# Patient Record
Sex: FEMALE | Race: OTHER
[De-identification: names, ages, dates, MRNs, and addresses within clinical notes are randomized per-mention and may not be internally consistent; named-entity substitution may affect disease eponyms.]

---

## 2020-06-12 ENCOUNTER — HOSPITAL ENCOUNTER (EMERGENCY)
Dept: HOSPITAL 94 - ER | Age: 65
Discharge: HOME | End: 2020-06-12
Payer: MEDICAID

## 2020-06-12 VITALS — HEIGHT: 59 IN | BODY MASS INDEX: 28.28 KG/M2 | WEIGHT: 140.3 LBS

## 2020-06-12 VITALS — SYSTOLIC BLOOD PRESSURE: 125 MMHG | DIASTOLIC BLOOD PRESSURE: 96 MMHG

## 2020-06-12 DIAGNOSIS — R94.8: ICD-10-CM

## 2020-06-12 DIAGNOSIS — R07.89: Primary | ICD-10-CM

## 2020-06-12 DIAGNOSIS — E11.9: ICD-10-CM

## 2020-06-12 DIAGNOSIS — R53.83: ICD-10-CM

## 2020-06-12 DIAGNOSIS — Z79.899: ICD-10-CM

## 2020-06-12 DIAGNOSIS — R06.2: ICD-10-CM

## 2020-06-12 DIAGNOSIS — E78.00: ICD-10-CM

## 2020-06-12 DIAGNOSIS — R53.1: ICD-10-CM

## 2020-06-12 DIAGNOSIS — I10: ICD-10-CM

## 2020-06-12 LAB
ALBUMIN SERPL BCP-MCNC: 4.4 G/DL (ref 3.4–5)
ALBUMIN/GLOB SERPL: 1 {RATIO} (ref 1.1–1.5)
ALP SERPL-CCNC: 71 IU/L (ref 46–116)
ALT SERPL W P-5'-P-CCNC: 36 U/L (ref 12–78)
ANION GAP SERPL CALCULATED.3IONS-SCNC: 10 MMOL/L (ref 8–16)
AST SERPL W P-5'-P-CCNC: 31 U/L (ref 10–37)
BASOPHILS # BLD AUTO: 0 X10'3 (ref 0–0.2)
BASOPHILS NFR BLD AUTO: 0.4 % (ref 0–1)
BILIRUB SERPL-MCNC: 0.3 MG/DL (ref 0.1–1)
BUN SERPL-MCNC: 12 MG/DL (ref 7–18)
BUN/CREAT SERPL: 12.9 (ref 6.6–38)
CALCIUM SERPL-MCNC: 9.2 MG/DL (ref 8.5–10.1)
CHLORIDE SERPL-SCNC: 94 MMOL/L (ref 99–107)
CO2 SERPL-SCNC: 27.8 MMOL/L (ref 24–32)
CREAT SERPL-MCNC: 0.93 MG/DL (ref 0.4–0.9)
EOSINOPHIL # BLD AUTO: 0 X10'3 (ref 0–0.9)
EOSINOPHIL NFR BLD AUTO: 0.6 % (ref 0–6)
ERYTHROCYTE [DISTWIDTH] IN BLOOD BY AUTOMATED COUNT: 13.9 % (ref 11.5–14.5)
GFR SERPL CREATININE-BSD FRML MDRD: 61 ML/MIN
GLUCOSE SERPL-MCNC: 153 MG/DL (ref 70–104)
HCT VFR BLD AUTO: 38.1 % (ref 35–45)
HGB BLD-MCNC: 12.8 G/DL (ref 12–16)
LYMPHOCYTES # BLD AUTO: 1.3 X10'3 (ref 1.1–4.8)
LYMPHOCYTES NFR BLD AUTO: 24.5 % (ref 21–51)
MCH RBC QN AUTO: 29.1 PG (ref 27–31)
MCHC RBC AUTO-ENTMCNC: 33.5 G/DL (ref 33–36.5)
MCV RBC AUTO: 86.9 FL (ref 78–98)
MONOCYTES # BLD AUTO: 0.5 X10'3 (ref 0–0.9)
MONOCYTES NFR BLD AUTO: 10.1 % (ref 2–12)
NEUTROPHILS # BLD AUTO: 3.3 X10'3 (ref 1.8–7.7)
NEUTROPHILS NFR BLD AUTO: 64.4 % (ref 42–75)
PLATELET # BLD AUTO: 233 X10'3 (ref 140–440)
PMV BLD AUTO: 7.4 FL (ref 7.4–10.4)
POTASSIUM SERPL-SCNC: 4 MMOL/L (ref 3.5–5.1)
PROT SERPL-MCNC: 8.7 G/DL (ref 6.4–8.2)
RBC # BLD AUTO: 4.38 X10'6 (ref 4.2–5.6)
SODIUM SERPL-SCNC: 132 MMOL/L (ref 135–145)
WBC # BLD AUTO: 5.2 X10'3 (ref 4.5–11)

## 2020-06-12 PROCEDURE — 36415 COLL VENOUS BLD VENIPUNCTURE: CPT

## 2020-06-12 PROCEDURE — 94760 N-INVAS EAR/PLS OXIMETRY 1: CPT

## 2020-06-12 PROCEDURE — 80053 COMPREHEN METABOLIC PANEL: CPT

## 2020-06-12 PROCEDURE — 93005 ELECTROCARDIOGRAM TRACING: CPT

## 2020-06-12 PROCEDURE — 71275 CT ANGIOGRAPHY CHEST: CPT

## 2020-06-12 PROCEDURE — 94640 AIRWAY INHALATION TREATMENT: CPT

## 2020-06-12 PROCEDURE — 84484 ASSAY OF TROPONIN QUANT: CPT

## 2020-06-12 PROCEDURE — 85025 COMPLETE CBC W/AUTO DIFF WBC: CPT

## 2020-06-12 PROCEDURE — 99285 EMERGENCY DEPT VISIT HI MDM: CPT

## 2020-06-12 PROCEDURE — 71045 X-RAY EXAM CHEST 1 VIEW: CPT

## 2021-03-16 ENCOUNTER — HOSPITAL ENCOUNTER (EMERGENCY)
Dept: HOSPITAL 94 - ER | Age: 66
Discharge: HOME | End: 2021-03-16
Payer: MEDICAID

## 2021-03-16 VITALS — HEIGHT: 59 IN | BODY MASS INDEX: 27.29 KG/M2 | WEIGHT: 135.36 LBS

## 2021-03-16 VITALS — DIASTOLIC BLOOD PRESSURE: 70 MMHG | SYSTOLIC BLOOD PRESSURE: 146 MMHG

## 2021-03-16 DIAGNOSIS — E11.9: ICD-10-CM

## 2021-03-16 DIAGNOSIS — R94.31: ICD-10-CM

## 2021-03-16 DIAGNOSIS — E78.00: ICD-10-CM

## 2021-03-16 DIAGNOSIS — R74.02: ICD-10-CM

## 2021-03-16 DIAGNOSIS — R51.9: ICD-10-CM

## 2021-03-16 DIAGNOSIS — Z79.899: ICD-10-CM

## 2021-03-16 DIAGNOSIS — J84.10: ICD-10-CM

## 2021-03-16 DIAGNOSIS — R42: Primary | ICD-10-CM

## 2021-03-16 DIAGNOSIS — I10: ICD-10-CM
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
## 2021-03-16 DIAGNOSIS — T78.8XXA: ICD-10-CM

## 2021-03-16 DIAGNOSIS — R11.10: ICD-10-CM

## 2021-03-16 LAB
ALBUMIN SERPL BCP-MCNC: 3.8 G/DL (ref 3.4–5)
ALBUMIN/GLOB SERPL: 0.8 {RATIO} (ref 1.1–1.5)
ALP SERPL-CCNC: 191 IU/L (ref 46–116)
ALT SERPL W P-5'-P-CCNC: 642 U/L (ref 12–78)
ANION GAP SERPL CALCULATED.3IONS-SCNC: 13 MMOL/L (ref 8–16)
AST SERPL W P-5'-P-CCNC: 293 U/L (ref 10–37)
BASOPHILS # BLD AUTO: 0 X10'3 (ref 0–0.2)
BASOPHILS NFR BLD AUTO: 0.3 % (ref 0–1)
BILIRUB SERPL-MCNC: 0.5 MG/DL (ref 0.1–1)
BUN SERPL-MCNC: 5 MG/DL (ref 7–18)
BUN/CREAT SERPL: 6.3 (ref 6.6–38)
CALCIUM SERPL-MCNC: 8.7 MG/DL (ref 8.5–10.1)
CHLORIDE SERPL-SCNC: 101 MMOL/L (ref 99–107)
CO2 SERPL-SCNC: 26 MMOL/L (ref 24–32)
CREAT SERPL-MCNC: 0.79 MG/DL (ref 0.4–0.9)
EOSINOPHIL # BLD AUTO: 0 X10'3 (ref 0–0.9)
EOSINOPHIL NFR BLD AUTO: 0.4 % (ref 0–6)
ERYTHROCYTE [DISTWIDTH] IN BLOOD BY AUTOMATED COUNT: 14.1 % (ref 11.5–14.5)
GFR SERPL CREATININE-BSD FRML MDRD: 73 ML/MIN
GLUCOSE SERPL-MCNC: 223 MG/DL (ref 70–104)
HCT VFR BLD AUTO: 38.5 % (ref 35–45)
HGB BLD-MCNC: 12.5 G/DL (ref 12–16)
LYMPHOCYTES # BLD AUTO: 0.8 X10'3 (ref 1.1–4.8)
LYMPHOCYTES NFR BLD AUTO: 13.8 % (ref 21–51)
MAGNESIUM SERPL-MCNC: 1.6 MG/DL (ref 1.5–2.4)
MCH RBC QN AUTO: 28.7 PG (ref 27–31)
MCHC RBC AUTO-ENTMCNC: 32.6 G/DL (ref 33–36.5)
MCV RBC AUTO: 87.9 FL (ref 78–98)
MONOCYTES # BLD AUTO: 0.4 X10'3 (ref 0–0.9)
MONOCYTES NFR BLD AUTO: 7.4 % (ref 2–12)
NEUTROPHILS # BLD AUTO: 4.6 X10'3 (ref 1.8–7.7)
NEUTROPHILS NFR BLD AUTO: 78.1 % (ref 42–75)
PLATELET # BLD AUTO: 206 X10'3 (ref 140–440)
PMV BLD AUTO: 7.3 FL (ref 7.4–10.4)
POTASSIUM SERPL-SCNC: 3.3 MMOL/L (ref 3.5–5.1)
PROT SERPL-MCNC: 8.5 G/DL (ref 6.4–8.2)
RBC # BLD AUTO: 4.38 X10'6 (ref 4.2–5.6)
SODIUM SERPL-SCNC: 140 MMOL/L (ref 135–145)
WBC # BLD AUTO: 5.9 X10'3 (ref 4.5–11)

## 2021-03-16 PROCEDURE — 96365 THER/PROPH/DIAG IV INF INIT: CPT

## 2021-03-16 PROCEDURE — 80053 COMPREHEN METABOLIC PANEL: CPT

## 2021-03-16 PROCEDURE — 85025 COMPLETE CBC W/AUTO DIFF WBC: CPT

## 2021-03-16 PROCEDURE — 83735 ASSAY OF MAGNESIUM: CPT

## 2021-03-16 PROCEDURE — 93005 ELECTROCARDIOGRAM TRACING: CPT

## 2021-03-16 PROCEDURE — 99285 EMERGENCY DEPT VISIT HI MDM: CPT

## 2021-03-16 PROCEDURE — 96375 TX/PRO/DX INJ NEW DRUG ADDON: CPT

## 2021-03-16 PROCEDURE — 70450 CT HEAD/BRAIN W/O DYE: CPT

## 2021-03-16 PROCEDURE — 36415 COLL VENOUS BLD VENIPUNCTURE: CPT

## 2021-10-03 ENCOUNTER — HOSPITAL ENCOUNTER (EMERGENCY)
Dept: HOSPITAL 94 - ER | Age: 66
Discharge: HOME | End: 2021-10-03
Payer: MEDICAID

## 2021-10-03 VITALS — BODY MASS INDEX: 23.56 KG/M2 | WEIGHT: 116.84 LBS | HEIGHT: 59 IN

## 2021-10-03 VITALS — SYSTOLIC BLOOD PRESSURE: 131 MMHG | DIASTOLIC BLOOD PRESSURE: 68 MMHG

## 2021-10-03 DIAGNOSIS — I27.20: ICD-10-CM

## 2021-10-03 DIAGNOSIS — Z79.899: ICD-10-CM

## 2021-10-03 DIAGNOSIS — E78.00: ICD-10-CM

## 2021-10-03 DIAGNOSIS — R42: Primary | ICD-10-CM

## 2021-10-03 DIAGNOSIS — E11.9: ICD-10-CM

## 2021-10-03 LAB
ALBUMIN SERPL BCP-MCNC: 3.9 G/DL (ref 3.4–5)
ALBUMIN/GLOB SERPL: 0.8 {RATIO} (ref 1.1–1.5)
ALP SERPL-CCNC: 90 IU/L (ref 46–116)
ALT SERPL W P-5'-P-CCNC: 24 U/L (ref 12–78)
AMPHETAMINES UR QL SCN: NEGATIVE
ANION GAP SERPL CALCULATED.3IONS-SCNC: 13 MMOL/L (ref 8–16)
AST SERPL W P-5'-P-CCNC: 20 U/L (ref 10–37)
BACTERIA URNS QL MICRO: (no result) /HPF
BARBITURATES UR QL SCN: NEGATIVE
BASOPHILS # BLD AUTO: 0 X10'3 (ref 0–0.2)
BASOPHILS NFR BLD AUTO: 0.2 % (ref 0–1)
BENZODIAZ UR QL SCN: NEGATIVE
BILIRUB SERPL-MCNC: 0.3 MG/DL (ref 0.1–1)
BUN SERPL-MCNC: 4 MG/DL (ref 7–18)
BUN/CREAT SERPL: 5.3 (ref 6.6–38)
BZE UR QL SCN: NEGATIVE
CALCIUM SERPL-MCNC: 9.1 MG/DL (ref 8.5–10.1)
CANNABINOIDS UR QL SCN: NEGATIVE
CHLORIDE SERPL-SCNC: 87 MMOL/L (ref 99–107)
CLARITY UR: (no result)
CO2 SERPL-SCNC: 24.8 MMOL/L (ref 24–32)
COLOR UR: (no result)
CREAT SERPL-MCNC: 0.75 MG/DL (ref 0.4–0.9)
DEPRECATED SQUAMOUS URNS QL MICRO: (no result) /LPF
EOSINOPHIL # BLD AUTO: 0 X10'3 (ref 0–0.9)
EOSINOPHIL NFR BLD AUTO: 0.1 % (ref 0–6)
ERYTHROCYTE [DISTWIDTH] IN BLOOD BY AUTOMATED COUNT: 13.9 % (ref 11.5–14.5)
ETHANOL SERPL-MCNC: < 0.01 GM/DL (ref 0–0.01)
GFR SERPL CREATININE-BSD FRML MDRD: 78 ML/MIN
GLUCOSE SERPL-MCNC: 200 MG/DL (ref 70–104)
GLUCOSE UR STRIP-MCNC: 250 MG/DL
HCG UR QL: NEGATIVE
HCT VFR BLD AUTO: 35.9 % (ref 35–45)
HGB BLD-MCNC: 12.1 G/DL (ref 12–16)
HGB UR QL STRIP: (no result)
KETONES UR STRIP-MCNC: 15 MG/DL
LEUKOCYTE ESTERASE UR QL STRIP: NEGATIVE
LIPASE SERPL-CCNC: 98 U/L (ref 73–393)
LYMPHOCYTES # BLD AUTO: 0.9 X10'3 (ref 1.1–4.8)
LYMPHOCYTES NFR BLD AUTO: 11.8 % (ref 21–51)
MCH RBC QN AUTO: 28.4 PG (ref 27–31)
MCHC RBC AUTO-ENTMCNC: 33.7 G/DL (ref 33–36.5)
MCV RBC AUTO: 84.1 FL (ref 78–98)
METHADONE UR QL SCN: NEGATIVE
MONOCYTES # BLD AUTO: 0.7 X10'3 (ref 0–0.9)
MONOCYTES NFR BLD AUTO: 8.7 % (ref 2–12)
NEUTROPHILS # BLD AUTO: 6.1 X10'3 (ref 1.8–7.7)
NEUTROPHILS NFR BLD AUTO: 79.2 % (ref 42–75)
NITRITE UR QL STRIP: NEGATIVE
OPIATES UR QL SCN: NEGATIVE
PCP UR QL SCN: NEGATIVE
PH UR STRIP: 7.5 [PH] (ref 4.8–8)
PLATELET # BLD AUTO: 273 X10'3 (ref 140–440)
PMV BLD AUTO: 6.7 FL (ref 7.4–10.4)
POTASSIUM SERPL-SCNC: 3.7 MMOL/L (ref 3.5–5.1)
PROT SERPL-MCNC: 8.9 G/DL (ref 6.4–8.2)
PROT UR QL STRIP: NEGATIVE MG/DL
RBC # BLD AUTO: 4.27 X10'6 (ref 4.2–5.6)
RBC #/AREA URNS HPF: (no result) /HPF (ref 0–2)
SODIUM SERPL-SCNC: 125 MMOL/L (ref 135–145)
SP GR UR STRIP: 1.01 (ref 1–1.03)
URN COLLECT METHOD CLASS: (no result)
UROBILINOGEN UR STRIP-MCNC: 0.2 E.U/DL (ref 0.2–1)
WBC # BLD AUTO: 7.7 X10'3 (ref 4.5–11)
WBC #/AREA URNS HPF: (no result) /HPF (ref 0–4)

## 2021-10-03 PROCEDURE — 80305 DRUG TEST PRSMV DIR OPT OBS: CPT

## 2021-10-03 PROCEDURE — 96360 HYDRATION IV INFUSION INIT: CPT

## 2021-10-03 PROCEDURE — 83880 ASSAY OF NATRIURETIC PEPTIDE: CPT

## 2021-10-03 PROCEDURE — 83690 ASSAY OF LIPASE: CPT

## 2021-10-03 PROCEDURE — 93005 ELECTROCARDIOGRAM TRACING: CPT

## 2021-10-03 PROCEDURE — 85025 COMPLETE CBC W/AUTO DIFF WBC: CPT

## 2021-10-03 PROCEDURE — 80053 COMPREHEN METABOLIC PANEL: CPT

## 2021-10-03 PROCEDURE — 84484 ASSAY OF TROPONIN QUANT: CPT

## 2021-10-03 PROCEDURE — 71045 X-RAY EXAM CHEST 1 VIEW: CPT

## 2021-10-03 PROCEDURE — 80320 DRUG SCREEN QUANTALCOHOLS: CPT

## 2021-10-03 PROCEDURE — 99285 EMERGENCY DEPT VISIT HI MDM: CPT

## 2021-10-03 PROCEDURE — 81025 URINE PREGNANCY TEST: CPT

## 2021-10-03 PROCEDURE — 81001 URINALYSIS AUTO W/SCOPE: CPT

## 2021-10-03 PROCEDURE — 36415 COLL VENOUS BLD VENIPUNCTURE: CPT

## 2022-01-17 ENCOUNTER — HOSPITAL ENCOUNTER (OUTPATIENT)
Dept: HOSPITAL 94 - CARD DIAG | Age: 67
Discharge: HOME | End: 2022-01-17
Attending: INTERNAL MEDICINE
Payer: MEDICAID

## 2022-01-17 DIAGNOSIS — E11.9: ICD-10-CM

## 2022-01-17 DIAGNOSIS — J84.10: ICD-10-CM

## 2022-01-17 DIAGNOSIS — I08.1: Primary | ICD-10-CM

## 2022-01-17 PROCEDURE — 93306 TTE W/DOPPLER COMPLETE: CPT

## 2022-04-10 ENCOUNTER — HOSPITAL ENCOUNTER (INPATIENT)
Dept: HOSPITAL 94 - ER | Age: 67
LOS: 4 days | Discharge: HOME | DRG: 140 | End: 2022-04-14
Attending: FAMILY MEDICINE | Admitting: FAMILY MEDICINE
Payer: MEDICAID

## 2022-04-10 VITALS — WEIGHT: 100.2 LBS | BODY MASS INDEX: 20.2 KG/M2 | HEIGHT: 59 IN

## 2022-04-10 VITALS — SYSTOLIC BLOOD PRESSURE: 162 MMHG | DIASTOLIC BLOOD PRESSURE: 89 MMHG

## 2022-04-10 DIAGNOSIS — E87.1: ICD-10-CM

## 2022-04-10 DIAGNOSIS — I27.20: ICD-10-CM

## 2022-04-10 DIAGNOSIS — E78.00: ICD-10-CM

## 2022-04-10 DIAGNOSIS — E78.5: ICD-10-CM

## 2022-04-10 DIAGNOSIS — I11.0: ICD-10-CM

## 2022-04-10 DIAGNOSIS — G43.909: ICD-10-CM

## 2022-04-10 DIAGNOSIS — Z79.899: ICD-10-CM

## 2022-04-10 DIAGNOSIS — J44.1: Primary | ICD-10-CM

## 2022-04-10 DIAGNOSIS — Z79.84: ICD-10-CM

## 2022-04-10 DIAGNOSIS — G62.9: ICD-10-CM

## 2022-04-10 DIAGNOSIS — J69.0: ICD-10-CM

## 2022-04-10 DIAGNOSIS — I50.33: ICD-10-CM

## 2022-04-10 DIAGNOSIS — E11.9: ICD-10-CM

## 2022-04-10 DIAGNOSIS — J84.10: ICD-10-CM

## 2022-04-10 DIAGNOSIS — Z20.822: ICD-10-CM

## 2022-04-10 DIAGNOSIS — J96.10: ICD-10-CM

## 2022-04-10 LAB
ALBUMIN SERPL BCP-MCNC: 3.3 G/DL (ref 3.4–5)
ALBUMIN/GLOB SERPL: 0.7 {RATIO} (ref 1.1–1.5)
ALP SERPL-CCNC: 56 IU/L (ref 46–116)
ALT SERPL W P-5'-P-CCNC: 23 U/L (ref 12–78)
ANION GAP SERPL CALCULATED.3IONS-SCNC: 14 MMOL/L (ref 8–16)
APTT PPP: 28 SECONDS (ref 22–32)
AST SERPL W P-5'-P-CCNC: 23 U/L (ref 10–37)
BASOPHILS # BLD AUTO: 0 X10'3 (ref 0–0.2)
BASOPHILS NFR BLD AUTO: 0.4 % (ref 0–1)
BILIRUB SERPL-MCNC: 0.2 MG/DL (ref 0.1–1)
BUN SERPL-MCNC: 6 MG/DL (ref 7–18)
BUN/CREAT SERPL: 7.4 (ref 6.6–38)
CALCIUM SERPL-MCNC: 9.4 MG/DL (ref 8.5–10.1)
CHLORIDE SERPL-SCNC: 91 MMOL/L (ref 99–107)
CK SERPL-CCNC: 80 U/L (ref 26–192)
CO2 SERPL-SCNC: 24.7 MMOL/L (ref 24–32)
CREAT SERPL-MCNC: 0.81 MG/DL (ref 0.4–0.9)
EOSINOPHIL # BLD AUTO: 0.1 X10'3 (ref 0–0.9)
EOSINOPHIL NFR BLD AUTO: 1.8 % (ref 0–6)
ERYTHROCYTE [DISTWIDTH] IN BLOOD BY AUTOMATED COUNT: 14.8 % (ref 11.5–14.5)
GFR SERPL CREATININE-BSD FRML MDRD: 71 ML/MIN
GLUCOSE SERPL-MCNC: 187 MG/DL (ref 70–104)
HBA1C MFR BLD: 6.8 % (ref 4.5–6.2)
HCT VFR BLD AUTO: 35.3 % (ref 35–45)
HGB BLD-MCNC: 11.6 G/DL (ref 12–16)
LIPASE SERPL-CCNC: 145 U/L (ref 73–393)
LYMPHOCYTES # BLD AUTO: 1.3 X10'3 (ref 1.1–4.8)
LYMPHOCYTES NFR BLD AUTO: 17.2 % (ref 21–51)
MAGNESIUM SERPL-MCNC: 1.9 MG/DL (ref 1.5–2.4)
MCH RBC QN AUTO: 27 PG (ref 27–31)
MCHC RBC AUTO-ENTMCNC: 33 G/DL (ref 33–36.5)
MCV RBC AUTO: 82 FL (ref 78–98)
MONOCYTES # BLD AUTO: 0.8 X10'3 (ref 0–0.9)
MONOCYTES NFR BLD AUTO: 10.3 % (ref 2–12)
NEUTROPHILS # BLD AUTO: 5.4 X10'3 (ref 1.8–7.7)
NEUTROPHILS NFR BLD AUTO: 70.3 % (ref 42–75)
PHOSPHATE SERPL-MCNC: 3.4 MG/DL (ref 2.3–4.5)
PLATELET # BLD AUTO: 338 X10'3 (ref 140–440)
PMV BLD AUTO: 6.9 FL (ref 7.4–10.4)
POTASSIUM SERPL-SCNC: 4 MMOL/L (ref 3.5–5.1)
PROT SERPL-MCNC: 8.2 G/DL (ref 6.4–8.2)
RBC # BLD AUTO: 4.3 X10'6 (ref 4.2–5.6)
SODIUM SERPL-SCNC: 130 MMOL/L (ref 135–145)
WBC # BLD AUTO: 7.7 X10'3 (ref 4.5–11)

## 2022-04-10 PROCEDURE — 97116 GAIT TRAINING THERAPY: CPT

## 2022-04-10 PROCEDURE — 97110 THERAPEUTIC EXERCISES: CPT

## 2022-04-10 PROCEDURE — 87635 SARS-COV-2 COVID-19 AMP PRB: CPT

## 2022-04-10 PROCEDURE — 85025 COMPLETE CBC W/AUTO DIFF WBC: CPT

## 2022-04-10 PROCEDURE — 84443 ASSAY THYROID STIM HORMONE: CPT

## 2022-04-10 PROCEDURE — 71045 X-RAY EXAM CHEST 1 VIEW: CPT

## 2022-04-10 PROCEDURE — 36415 COLL VENOUS BLD VENIPUNCTURE: CPT

## 2022-04-10 PROCEDURE — 96365 THER/PROPH/DIAG IV INF INIT: CPT

## 2022-04-10 PROCEDURE — 94760 N-INVAS EAR/PLS OXIMETRY 1: CPT

## 2022-04-10 PROCEDURE — 80061 LIPID PANEL: CPT

## 2022-04-10 PROCEDURE — 83036 HEMOGLOBIN GLYCOSYLATED A1C: CPT

## 2022-04-10 PROCEDURE — 84484 ASSAY OF TROPONIN QUANT: CPT

## 2022-04-10 PROCEDURE — 83880 ASSAY OF NATRIURETIC PEPTIDE: CPT

## 2022-04-10 PROCEDURE — 82550 ASSAY OF CK (CPK): CPT

## 2022-04-10 PROCEDURE — 71275 CT ANGIOGRAPHY CHEST: CPT

## 2022-04-10 PROCEDURE — 83735 ASSAY OF MAGNESIUM: CPT

## 2022-04-10 PROCEDURE — 99285 EMERGENCY DEPT VISIT HI MDM: CPT

## 2022-04-10 PROCEDURE — 85730 THROMBOPLASTIN TIME PARTIAL: CPT

## 2022-04-10 PROCEDURE — 80053 COMPREHEN METABOLIC PANEL: CPT

## 2022-04-10 PROCEDURE — 83690 ASSAY OF LIPASE: CPT

## 2022-04-10 PROCEDURE — 92616 FEES W/LARYNGEAL SENSE TEST: CPT

## 2022-04-10 PROCEDURE — 93005 ELECTROCARDIOGRAM TRACING: CPT

## 2022-04-10 PROCEDURE — 97530 THERAPEUTIC ACTIVITIES: CPT

## 2022-04-10 PROCEDURE — 84145 PROCALCITONIN (PCT): CPT

## 2022-04-10 PROCEDURE — 84100 ASSAY OF PHOSPHORUS: CPT

## 2022-04-10 PROCEDURE — 97161 PT EVAL LOW COMPLEX 20 MIN: CPT

## 2022-04-10 PROCEDURE — 94667 MNPJ CHEST WALL 1ST: CPT

## 2022-04-10 PROCEDURE — 94640 AIRWAY INHALATION TREATMENT: CPT

## 2022-04-10 PROCEDURE — 85610 PROTHROMBIN TIME: CPT

## 2022-04-10 PROCEDURE — 87081 CULTURE SCREEN ONLY: CPT

## 2022-04-10 PROCEDURE — 92508 TX SP LANG VOICE COMM GROUP: CPT

## 2022-04-10 PROCEDURE — 82948 REAGENT STRIP/BLOOD GLUCOSE: CPT

## 2022-04-10 RX ADMIN — SODIUM CHLORIDE SCH MLS/HR: 9 INJECTION INTRAMUSCULAR; INTRAVENOUS; SUBCUTANEOUS at 00:33

## 2022-04-11 VITALS — SYSTOLIC BLOOD PRESSURE: 140 MMHG | DIASTOLIC BLOOD PRESSURE: 79 MMHG

## 2022-04-11 VITALS — DIASTOLIC BLOOD PRESSURE: 83 MMHG | SYSTOLIC BLOOD PRESSURE: 143 MMHG

## 2022-04-11 VITALS — DIASTOLIC BLOOD PRESSURE: 71 MMHG | SYSTOLIC BLOOD PRESSURE: 149 MMHG

## 2022-04-11 VITALS — SYSTOLIC BLOOD PRESSURE: 131 MMHG | DIASTOLIC BLOOD PRESSURE: 77 MMHG

## 2022-04-11 LAB
ALBUMIN SERPL BCP-MCNC: 3.2 G/DL (ref 3.4–5)
ALBUMIN/GLOB SERPL: 0.7 {RATIO} (ref 1.1–1.5)
ALP SERPL-CCNC: 52 IU/L (ref 46–116)
ALT SERPL W P-5'-P-CCNC: 22 U/L (ref 12–78)
ANION GAP SERPL CALCULATED.3IONS-SCNC: 7 MMOL/L (ref 8–16)
AST SERPL W P-5'-P-CCNC: 22 U/L (ref 10–37)
BASOPHILS # BLD AUTO: 0 X10'3 (ref 0–0.2)
BASOPHILS NFR BLD AUTO: 0.4 % (ref 0–1)
BILIRUB SERPL-MCNC: 0.3 MG/DL (ref 0.1–1)
BUN SERPL-MCNC: 4 MG/DL (ref 7–18)
BUN/CREAT SERPL: 6.2 (ref 6.6–38)
CALCIUM SERPL-MCNC: 9.2 MG/DL (ref 8.5–10.1)
CHLORIDE SERPL-SCNC: 102 MMOL/L (ref 99–107)
CHOLEST SERPL-MCNC: 129 MG/DL (ref 0–200)
CHOLEST/HDLC SERPL: 2.4 {RATIO} (ref 0–4.99)
CO2 SERPL-SCNC: 28.5 MMOL/L (ref 24–32)
CREAT SERPL-MCNC: 0.65 MG/DL (ref 0.4–0.9)
EOSINOPHIL # BLD AUTO: 0.2 X10'3 (ref 0–0.9)
EOSINOPHIL NFR BLD AUTO: 3 % (ref 0–6)
ERYTHROCYTE [DISTWIDTH] IN BLOOD BY AUTOMATED COUNT: 15 % (ref 11.5–14.5)
GFR SERPL CREATININE-BSD FRML MDRD: > 90 ML/MIN
GLUCOSE SERPL-MCNC: 115 MG/DL (ref 70–104)
HCT VFR BLD AUTO: 34.3 % (ref 35–45)
HDLC SERPL-MCNC: 53 MG/DL (ref 35–60)
HGB BLD-MCNC: 11.3 G/DL (ref 12–16)
LDLC SERPL DIRECT ASSAY-MCNC: 67 MG/DL (ref 50–100)
LYMPHOCYTES # BLD AUTO: 1.6 X10'3 (ref 1.1–4.8)
LYMPHOCYTES NFR BLD AUTO: 25.8 % (ref 21–51)
MCH RBC QN AUTO: 26.5 PG (ref 27–31)
MCHC RBC AUTO-ENTMCNC: 32.8 G/DL (ref 33–36.5)
MCV RBC AUTO: 80.9 FL (ref 78–98)
MONOCYTES # BLD AUTO: 0.8 X10'3 (ref 0–0.9)
MONOCYTES NFR BLD AUTO: 12.9 % (ref 2–12)
NEUTROPHILS # BLD AUTO: 3.7 X10'3 (ref 1.8–7.7)
NEUTROPHILS NFR BLD AUTO: 57.9 % (ref 42–75)
PLATELET # BLD AUTO: 328 X10'3 (ref 140–440)
PMV BLD AUTO: 7.3 FL (ref 7.4–10.4)
POTASSIUM SERPL-SCNC: 3.9 MMOL/L (ref 3.5–5.1)
PROT SERPL-MCNC: 7.7 G/DL (ref 6.4–8.2)
RBC # BLD AUTO: 4.24 X10'6 (ref 4.2–5.6)
SODIUM SERPL-SCNC: 137 MMOL/L (ref 135–145)
TRIGL SERPL-MCNC: 41 MG/DL (ref 20–135)
WBC # BLD AUTO: 6.3 X10'3 (ref 4.5–11)

## 2022-04-11 PROCEDURE — B32S1ZZ COMPUTERIZED TOMOGRAPHY (CT SCAN) OF RIGHT PULMONARY ARTERY USING LOW OSMOLAR CONTRAST: ICD-10-PCS

## 2022-04-11 PROCEDURE — B32T1ZZ COMPUTERIZED TOMOGRAPHY (CT SCAN) OF LEFT PULMONARY ARTERY USING LOW OSMOLAR CONTRAST: ICD-10-PCS

## 2022-04-11 PROCEDURE — B3201ZZ COMPUTERIZED TOMOGRAPHY (CT SCAN) OF THORACIC AORTA USING LOW OSMOLAR CONTRAST: ICD-10-PCS

## 2022-04-11 RX ADMIN — Medication SCH UNIT: at 08:00

## 2022-04-11 RX ADMIN — SODIUM CHLORIDE SCH MLS/HR: 9 INJECTION INTRAMUSCULAR; INTRAVENOUS; SUBCUTANEOUS at 12:33

## 2022-04-11 RX ADMIN — INSULIN LISPRO SCH UNITS: 100 INJECTION, SOLUTION INTRAVENOUS; SUBCUTANEOUS at 18:39

## 2022-04-11 RX ADMIN — HEPARIN SODIUM SCH UNIT: 5000 INJECTION, SOLUTION INTRAVENOUS; SUBCUTANEOUS at 20:22

## 2022-04-11 RX ADMIN — TAZOBACTAM SODIUM AND PIPERACILLIN SODIUM SCH MLS/HR: 500; 4 INJECTION, SOLUTION INTRAVENOUS at 08:00

## 2022-04-11 RX ADMIN — DOCUSATE SODIUM SCH MG: 100 CAPSULE, LIQUID FILLED ORAL at 08:00

## 2022-04-11 RX ADMIN — SODIUM CHLORIDE SCH MLS/HR: 9 INJECTION INTRAMUSCULAR; INTRAVENOUS; SUBCUTANEOUS at 18:20

## 2022-04-11 RX ADMIN — TAZOBACTAM SODIUM AND PIPERACILLIN SODIUM SCH MLS/HR: 500; 4 INJECTION, SOLUTION INTRAVENOUS at 20:16

## 2022-04-11 RX ADMIN — VANCOMYCIN HYDROCHLORIDE SCH MLS/HR: 750 INJECTION, POWDER, LYOPHILIZED, FOR SOLUTION INTRAVENOUS at 23:11

## 2022-04-11 RX ADMIN — HEPARIN SODIUM SCH UNIT: 5000 INJECTION, SOLUTION INTRAVENOUS; SUBCUTANEOUS at 12:32

## 2022-04-11 RX ADMIN — METHYLPREDNISOLONE SODIUM SUCCINATE SCH MG: 40 INJECTION, POWDER, FOR SOLUTION INTRAMUSCULAR; INTRAVENOUS at 12:31

## 2022-04-11 RX ADMIN — METHYLPREDNISOLONE SODIUM SUCCINATE SCH MG: 40 INJECTION, POWDER, FOR SOLUTION INTRAMUSCULAR; INTRAVENOUS at 20:20

## 2022-04-11 RX ADMIN — PANTOPRAZOLE SODIUM SCH MLS/HR: 40 INJECTION, POWDER, FOR SOLUTION INTRAVENOUS at 12:31

## 2022-04-11 RX ADMIN — VANCOMYCIN HYDROCHLORIDE SCH MLS/HR: 750 INJECTION, POWDER, LYOPHILIZED, FOR SOLUTION INTRAVENOUS at 01:02

## 2022-04-11 RX ADMIN — DOCUSATE SODIUM SCH MG: 100 CAPSULE, LIQUID FILLED ORAL at 20:22

## 2022-04-11 RX ADMIN — Medication SCH UNIT: at 19:36

## 2022-04-11 RX ADMIN — Medication SCH UNIT: at 13:00

## 2022-04-11 NOTE — NUR
Problems reprioritized. Patient report given, questions answered & plan of care reviewed 
with SILVIANO CHANG.

## 2022-04-11 NOTE — NUR
eceived report from Breanne SHORE in the ER. Pt arrived on the unit via gurney and was able to 
ambulate with assistance to her bed. Pt was on 2L via N/C, [place] locked, personal 
belongings were placed at bedside except for her phone which she kept with her. Pt is 
currently NPO, this was explained to her with her  at bedside. Pt has no signs of 
distress, will continue to monitor.

## 2022-04-11 NOTE — NUR
Problems reprioritized. Patient report given, questions answered & plan of care reviewed 
with Justina SHORE.

## 2022-04-11 NOTE — NUR
Patient in room . I have received report from Justina SHORE   and had the opportunity to 
ask questions and assume patient care.

## 2022-04-11 NOTE — NUR
Patient in room [facility] 347B. I have received report from SILVIANO CHANG and had the opportunity 
to ask questions and assume patient care.

## 2022-04-12 VITALS — SYSTOLIC BLOOD PRESSURE: 130 MMHG | DIASTOLIC BLOOD PRESSURE: 64 MMHG

## 2022-04-12 VITALS — SYSTOLIC BLOOD PRESSURE: 128 MMHG | DIASTOLIC BLOOD PRESSURE: 48 MMHG

## 2022-04-12 VITALS — DIASTOLIC BLOOD PRESSURE: 47 MMHG | SYSTOLIC BLOOD PRESSURE: 139 MMHG

## 2022-04-12 LAB
ALBUMIN SERPL BCP-MCNC: 2.9 G/DL (ref 3.4–5)
ALBUMIN/GLOB SERPL: 0.6 {RATIO} (ref 1.1–1.5)
ALP SERPL-CCNC: 46 IU/L (ref 46–116)
ALT SERPL W P-5'-P-CCNC: 18 U/L (ref 12–78)
ANION GAP SERPL CALCULATED.3IONS-SCNC: 10 MMOL/L (ref 8–16)
AST SERPL W P-5'-P-CCNC: 21 U/L (ref 10–37)
BASOPHILS # BLD AUTO: 0 X10'3 (ref 0–0.2)
BASOPHILS NFR BLD AUTO: 0.1 % (ref 0–1)
BILIRUB SERPL-MCNC: 0.3 MG/DL (ref 0.1–1)
BUN SERPL-MCNC: 6 MG/DL (ref 7–18)
BUN/CREAT SERPL: 9.4 (ref 6.6–38)
CALCIUM SERPL-MCNC: 9 MG/DL (ref 8.5–10.1)
CHLORIDE SERPL-SCNC: 98 MMOL/L (ref 99–107)
CO2 SERPL-SCNC: 25.8 MMOL/L (ref 24–32)
CREAT SERPL-MCNC: 0.64 MG/DL (ref 0.4–0.9)
EOSINOPHIL # BLD AUTO: 0 X10'3 (ref 0–0.9)
EOSINOPHIL NFR BLD AUTO: 0 % (ref 0–6)
ERYTHROCYTE [DISTWIDTH] IN BLOOD BY AUTOMATED COUNT: 14.6 % (ref 11.5–14.5)
GFR SERPL CREATININE-BSD FRML MDRD: > 90 ML/MIN
GLUCOSE SERPL-MCNC: 136 MG/DL (ref 70–104)
HCT VFR BLD AUTO: 33.8 % (ref 35–45)
HGB BLD-MCNC: 11.1 G/DL (ref 12–16)
LYMPHOCYTES # BLD AUTO: 1 X10'3 (ref 1.1–4.8)
LYMPHOCYTES NFR BLD AUTO: 18.1 % (ref 21–51)
MCH RBC QN AUTO: 26.6 PG (ref 27–31)
MCHC RBC AUTO-ENTMCNC: 32.8 G/DL (ref 33–36.5)
MCV RBC AUTO: 81.2 FL (ref 78–98)
MONOCYTES # BLD AUTO: 0.5 X10'3 (ref 0–0.9)
MONOCYTES NFR BLD AUTO: 8.6 % (ref 2–12)
NEUTROPHILS # BLD AUTO: 4.1 X10'3 (ref 1.8–7.7)
NEUTROPHILS NFR BLD AUTO: 73.2 % (ref 42–75)
PLATELET # BLD AUTO: 326 X10'3 (ref 140–440)
PMV BLD AUTO: 7.2 FL (ref 7.4–10.4)
POTASSIUM SERPL-SCNC: 3.6 MMOL/L (ref 3.5–5.1)
PROT SERPL-MCNC: 7.6 G/DL (ref 6.4–8.2)
RBC # BLD AUTO: 4.16 X10'6 (ref 4.2–5.6)
SODIUM SERPL-SCNC: 134 MMOL/L (ref 135–145)
WBC # BLD AUTO: 5.6 X10'3 (ref 4.5–11)

## 2022-04-12 RX ADMIN — INSULIN LISPRO SCH UNITS: 100 INJECTION, SOLUTION INTRAVENOUS; SUBCUTANEOUS at 19:37

## 2022-04-12 RX ADMIN — SODIUM CHLORIDE SCH MLS/HR: 9 INJECTION INTRAMUSCULAR; INTRAVENOUS; SUBCUTANEOUS at 05:03

## 2022-04-12 RX ADMIN — INSULIN LISPRO SCH UNITS: 100 INJECTION, SOLUTION INTRAVENOUS; SUBCUTANEOUS at 09:32

## 2022-04-12 RX ADMIN — TAZOBACTAM SODIUM AND PIPERACILLIN SODIUM SCH MLS/HR: 500; 4 INJECTION, SOLUTION INTRAVENOUS at 16:20

## 2022-04-12 RX ADMIN — DOCUSATE SODIUM SCH MG: 100 CAPSULE, LIQUID FILLED ORAL at 07:23

## 2022-04-12 RX ADMIN — SODIUM CHLORIDE SCH MLS/HR: 9 INJECTION INTRAMUSCULAR; INTRAVENOUS; SUBCUTANEOUS at 04:20

## 2022-04-12 RX ADMIN — METHYLPREDNISOLONE SODIUM SUCCINATE SCH MG: 40 INJECTION, POWDER, FOR SOLUTION INTRAMUSCULAR; INTRAVENOUS at 19:40

## 2022-04-12 RX ADMIN — DOCUSATE SODIUM SCH MG: 100 CAPSULE, LIQUID FILLED ORAL at 19:42

## 2022-04-12 RX ADMIN — Medication SCH UNIT: at 19:46

## 2022-04-12 RX ADMIN — PANTOPRAZOLE SODIUM SCH MLS/HR: 40 INJECTION, POWDER, FOR SOLUTION INTRAVENOUS at 09:19

## 2022-04-12 RX ADMIN — SODIUM CHLORIDE SCH MLS/HR: 9 INJECTION INTRAMUSCULAR; INTRAVENOUS; SUBCUTANEOUS at 22:03

## 2022-04-12 RX ADMIN — HEPARIN SODIUM SCH UNIT: 5000 INJECTION, SOLUTION INTRAVENOUS; SUBCUTANEOUS at 19:42

## 2022-04-12 RX ADMIN — Medication SCH UNIT: at 13:00

## 2022-04-12 RX ADMIN — Medication SCH UNIT: at 08:00

## 2022-04-12 RX ADMIN — METHYLPREDNISOLONE SODIUM SUCCINATE SCH MG: 40 INJECTION, POWDER, FOR SOLUTION INTRAMUSCULAR; INTRAVENOUS at 07:23

## 2022-04-12 RX ADMIN — HEPARIN SODIUM SCH UNIT: 5000 INJECTION, SOLUTION INTRAVENOUS; SUBCUTANEOUS at 07:23

## 2022-04-12 RX ADMIN — TAZOBACTAM SODIUM AND PIPERACILLIN SODIUM SCH MLS/HR: 500; 4 INJECTION, SOLUTION INTRAVENOUS at 07:23

## 2022-04-12 NOTE — NUR
Problems reprioritized. Patient report given, questions answered & plan of care reviewed 
with María SHORE.

## 2022-04-12 NOTE — NUR
Patient in room . I have received report from Abena SHORE   and had the opportunity 
to ask questions and assume patient care.

## 2022-04-12 NOTE — NUR
Paged Dr. Bateman

PAGER ID:  [phone] 

MESSAGE:  Surgical María SHORE ext [phone] RE: darin Capone. Patient has own Symbicort at 
bedside. Can we get order for patient to have it?

## 2022-04-13 VITALS — DIASTOLIC BLOOD PRESSURE: 69 MMHG | SYSTOLIC BLOOD PRESSURE: 140 MMHG

## 2022-04-13 VITALS — SYSTOLIC BLOOD PRESSURE: 150 MMHG | DIASTOLIC BLOOD PRESSURE: 77 MMHG

## 2022-04-13 VITALS — SYSTOLIC BLOOD PRESSURE: 126 MMHG | DIASTOLIC BLOOD PRESSURE: 65 MMHG

## 2022-04-13 VITALS — SYSTOLIC BLOOD PRESSURE: 143 MMHG | DIASTOLIC BLOOD PRESSURE: 81 MMHG

## 2022-04-13 LAB
ALBUMIN SERPL BCP-MCNC: 3.1 G/DL (ref 3.4–5)
ALBUMIN/GLOB SERPL: 0.6 {RATIO} (ref 1.1–1.5)
ALP SERPL-CCNC: 51 IU/L (ref 46–116)
ALT SERPL W P-5'-P-CCNC: 22 U/L (ref 12–78)
ANION GAP SERPL CALCULATED.3IONS-SCNC: 11 MMOL/L (ref 8–16)
AST SERPL W P-5'-P-CCNC: 24 U/L (ref 10–37)
BASOPHILS # BLD AUTO: 0 X10'3 (ref 0–0.2)
BASOPHILS NFR BLD AUTO: 0.1 % (ref 0–1)
BILIRUB SERPL-MCNC: 0.2 MG/DL (ref 0.1–1)
BUN SERPL-MCNC: 7 MG/DL (ref 7–18)
BUN/CREAT SERPL: 10.1 (ref 6.6–38)
CALCIUM SERPL-MCNC: 8.8 MG/DL (ref 8.5–10.1)
CHLORIDE SERPL-SCNC: 98 MMOL/L (ref 99–107)
CO2 SERPL-SCNC: 28.1 MMOL/L (ref 24–32)
CREAT SERPL-MCNC: 0.69 MG/DL (ref 0.4–0.9)
EOSINOPHIL # BLD AUTO: 0 X10'3 (ref 0–0.9)
EOSINOPHIL NFR BLD AUTO: 0 % (ref 0–6)
ERYTHROCYTE [DISTWIDTH] IN BLOOD BY AUTOMATED COUNT: 15.3 % (ref 11.5–14.5)
GFR SERPL CREATININE-BSD FRML MDRD: 85 ML/MIN
GLUCOSE SERPL-MCNC: 152 MG/DL (ref 70–104)
HCT VFR BLD AUTO: 34.3 % (ref 35–45)
HGB BLD-MCNC: 11.5 G/DL (ref 12–16)
LYMPHOCYTES # BLD AUTO: 1.3 X10'3 (ref 1.1–4.8)
LYMPHOCYTES NFR BLD AUTO: 17.6 % (ref 21–51)
MAGNESIUM SERPL-MCNC: 2 MG/DL (ref 1.5–2.4)
MCH RBC QN AUTO: 27.6 PG (ref 27–31)
MCHC RBC AUTO-ENTMCNC: 33.6 G/DL (ref 33–36.5)
MCV RBC AUTO: 82 FL (ref 78–98)
MONOCYTES # BLD AUTO: 0.5 X10'3 (ref 0–0.9)
MONOCYTES NFR BLD AUTO: 7 % (ref 2–12)
NEUTROPHILS # BLD AUTO: 5.7 X10'3 (ref 1.8–7.7)
NEUTROPHILS NFR BLD AUTO: 75.3 % (ref 42–75)
PLATELET # BLD AUTO: 336 X10'3 (ref 140–440)
PMV BLD AUTO: 7.2 FL (ref 7.4–10.4)
POTASSIUM SERPL-SCNC: 3.3 MMOL/L (ref 3.5–5.1)
PROT SERPL-MCNC: 8 G/DL (ref 6.4–8.2)
RBC # BLD AUTO: 4.18 X10'6 (ref 4.2–5.6)
SODIUM SERPL-SCNC: 137 MMOL/L (ref 135–145)
WBC # BLD AUTO: 7.6 X10'3 (ref 4.5–11)

## 2022-04-13 RX ADMIN — POTASSIUM CHLORIDE PRN MEQ: 1500 TABLET, EXTENDED RELEASE ORAL at 14:41

## 2022-04-13 RX ADMIN — DOCUSATE SODIUM SCH MG: 100 CAPSULE, LIQUID FILLED ORAL at 08:00

## 2022-04-13 RX ADMIN — LANSOPRAZOLE SCH MG: 15 TABLET, ORALLY DISINTEGRATING, DELAYED RELEASE ORAL at 08:37

## 2022-04-13 RX ADMIN — SODIUM CHLORIDE SCH MLS/HR: 9 INJECTION INTRAMUSCULAR; INTRAVENOUS; SUBCUTANEOUS at 08:44

## 2022-04-13 RX ADMIN — METHYLPREDNISOLONE SODIUM SUCCINATE SCH MG: 40 INJECTION, POWDER, FOR SOLUTION INTRAMUSCULAR; INTRAVENOUS at 08:36

## 2022-04-13 RX ADMIN — TAZOBACTAM SODIUM AND PIPERACILLIN SODIUM SCH MLS/HR: 500; 4 INJECTION, SOLUTION INTRAVENOUS at 16:11

## 2022-04-13 RX ADMIN — POTASSIUM CHLORIDE PRN MEQ: 1500 TABLET, EXTENDED RELEASE ORAL at 22:04

## 2022-04-13 RX ADMIN — INSULIN LISPRO SCH UNITS: 100 INJECTION, SOLUTION INTRAVENOUS; SUBCUTANEOUS at 13:42

## 2022-04-13 RX ADMIN — TAZOBACTAM SODIUM AND PIPERACILLIN SODIUM SCH MLS/HR: 500; 4 INJECTION, SOLUTION INTRAVENOUS at 00:17

## 2022-04-13 RX ADMIN — DOCUSATE SODIUM SCH MG: 100 CAPSULE, LIQUID FILLED ORAL at 20:00

## 2022-04-13 RX ADMIN — Medication SCH UNIT: at 22:26

## 2022-04-13 RX ADMIN — HEPARIN SODIUM SCH UNIT: 5000 INJECTION, SOLUTION INTRAVENOUS; SUBCUTANEOUS at 08:37

## 2022-04-13 RX ADMIN — METHYLPREDNISOLONE SODIUM SUCCINATE SCH MG: 40 INJECTION, POWDER, FOR SOLUTION INTRAMUSCULAR; INTRAVENOUS at 19:41

## 2022-04-13 RX ADMIN — HEPARIN SODIUM SCH UNIT: 5000 INJECTION, SOLUTION INTRAVENOUS; SUBCUTANEOUS at 19:43

## 2022-04-13 RX ADMIN — SODIUM CHLORIDE SCH MLS/HR: 9 INJECTION INTRAMUSCULAR; INTRAVENOUS; SUBCUTANEOUS at 17:16

## 2022-04-13 RX ADMIN — Medication SCH UNIT: at 13:00

## 2022-04-13 RX ADMIN — Medication SCH UNIT: at 21:00

## 2022-04-13 RX ADMIN — Medication SCH UNIT: at 08:00

## 2022-04-13 RX ADMIN — TAZOBACTAM SODIUM AND PIPERACILLIN SODIUM SCH MLS/HR: 500; 4 INJECTION, SOLUTION INTRAVENOUS at 08:36

## 2022-04-13 NOTE — NUR
Patient blood sugar this afternoon was 77 mg/dl, asymptomatic. Patient was given snacks - 
orange juice, yogurt, and apple sauce.

## 2022-04-13 NOTE — NUR
sitting up in bed no complaits, home medication taken to pharmacy to verify.  Resp paged re 
pt inhaler, has not been given tonight.

-------------------------------------------------------------------------------

Addendum: 04/13/22 at 2222 by Gerardo Nguyen RN

-------------------------------------------------------------------------------

Amended: Links added.

## 2022-04-13 NOTE — NUR
Diabetes consult: Noted pt w/ hx of DM, A1c 6.8, fair control and appropriate per ADA 
guidelines. DM ed not indicated at this time.




-------------------------------------------------------------------------------

Addendum: 04/13/22 at 0707 by Mike Bourgeois RD

-------------------------------------------------------------------------------

Amended: Links added.

## 2022-04-13 NOTE — NUR
Paged Dr. Behl

[facility] RN [phone] RE: Jef Capone, K today is 3.3. Can I get order for K 
replacement protocol?

## 2022-04-13 NOTE — NUR
Pt had applesauce prior to taking bg.

-------------------------------------------------------------------------------

Addendum: 04/13/22 at 2217 by Gerardo Nguyen RN

-------------------------------------------------------------------------------

Amended: Links added.

## 2022-04-13 NOTE — NUR
Patient in room . I have received report from SILVIANO Daniel   and had the opportunity to 
ask questions and assume patient care.

-------------------------------------------------------------------------------

Addendum: 04/13/22 at 1950 by Gerardo Nguyen RN

-------------------------------------------------------------------------------

Amended: Links added.

## 2022-04-14 VITALS — SYSTOLIC BLOOD PRESSURE: 161 MMHG | DIASTOLIC BLOOD PRESSURE: 81 MMHG

## 2022-04-14 VITALS — SYSTOLIC BLOOD PRESSURE: 158 MMHG | DIASTOLIC BLOOD PRESSURE: 75 MMHG

## 2022-04-14 VITALS — SYSTOLIC BLOOD PRESSURE: 161 MMHG

## 2022-04-14 LAB
ALBUMIN SERPL BCP-MCNC: 3.4 G/DL (ref 3.4–5)
ALBUMIN/GLOB SERPL: 0.7 {RATIO} (ref 1.1–1.5)
ALP SERPL-CCNC: 63 IU/L (ref 46–116)
ALT SERPL W P-5'-P-CCNC: 66 U/L (ref 12–78)
ANION GAP SERPL CALCULATED.3IONS-SCNC: 11 MMOL/L (ref 8–16)
AST SERPL W P-5'-P-CCNC: 74 U/L (ref 10–37)
BASOPHILS # BLD AUTO: 0 X10'3 (ref 0–0.2)
BASOPHILS NFR BLD AUTO: 0.3 % (ref 0–1)
BILIRUB SERPL-MCNC: 0.2 MG/DL (ref 0.1–1)
BUN SERPL-MCNC: 8 MG/DL (ref 7–18)
BUN/CREAT SERPL: 8.7 (ref 6.6–38)
CALCIUM SERPL-MCNC: 8.9 MG/DL (ref 8.5–10.1)
CHLORIDE SERPL-SCNC: 98 MMOL/L (ref 99–107)
CO2 SERPL-SCNC: 26 MMOL/L (ref 24–32)
CREAT SERPL-MCNC: 0.92 MG/DL (ref 0.4–0.9)
EOSINOPHIL # BLD AUTO: 0 X10'3 (ref 0–0.9)
EOSINOPHIL NFR BLD AUTO: 0.1 % (ref 0–6)
ERYTHROCYTE [DISTWIDTH] IN BLOOD BY AUTOMATED COUNT: 14.9 % (ref 11.5–14.5)
GFR SERPL CREATININE-BSD FRML MDRD: 61 ML/MIN
GLUCOSE SERPL-MCNC: 199 MG/DL (ref 70–104)
HCT VFR BLD AUTO: 36 % (ref 35–45)
HGB BLD-MCNC: 11.9 G/DL (ref 12–16)
LYMPHOCYTES # BLD AUTO: 2.5 X10'3 (ref 1.1–4.8)
LYMPHOCYTES NFR BLD AUTO: 30.5 % (ref 21–51)
MAGNESIUM SERPL-MCNC: 2.1 MG/DL (ref 1.5–2.4)
MCH RBC QN AUTO: 27.2 PG (ref 27–31)
MCHC RBC AUTO-ENTMCNC: 33.1 G/DL (ref 33–36.5)
MCV RBC AUTO: 82.3 FL (ref 78–98)
MONOCYTES # BLD AUTO: 0.8 X10'3 (ref 0–0.9)
MONOCYTES NFR BLD AUTO: 9.3 % (ref 2–12)
NEUTROPHILS # BLD AUTO: 4.9 X10'3 (ref 1.8–7.7)
NEUTROPHILS NFR BLD AUTO: 59.8 % (ref 42–75)
PLATELET # BLD AUTO: 396 X10'3 (ref 140–440)
PMV BLD AUTO: 7.1 FL (ref 7.4–10.4)
POTASSIUM SERPL-SCNC: 3.6 MMOL/L (ref 3.5–5.1)
PROT SERPL-MCNC: 8.5 G/DL (ref 6.4–8.2)
RBC # BLD AUTO: 4.38 X10'6 (ref 4.2–5.6)
SODIUM SERPL-SCNC: 135 MMOL/L (ref 135–145)
WBC # BLD AUTO: 8.3 X10'3 (ref 4.5–11)

## 2022-04-14 RX ADMIN — LANSOPRAZOLE SCH MG: 15 TABLET, ORALLY DISINTEGRATING, DELAYED RELEASE ORAL at 08:45

## 2022-04-14 RX ADMIN — Medication SCH UNIT: at 08:44

## 2022-04-14 RX ADMIN — TAZOBACTAM SODIUM AND PIPERACILLIN SODIUM SCH MLS/HR: 500; 4 INJECTION, SOLUTION INTRAVENOUS at 08:44

## 2022-04-14 RX ADMIN — DOCUSATE SODIUM SCH MG: 100 CAPSULE, LIQUID FILLED ORAL at 08:00

## 2022-04-14 RX ADMIN — SODIUM CHLORIDE SCH MLS/HR: 9 INJECTION INTRAMUSCULAR; INTRAVENOUS; SUBCUTANEOUS at 02:19

## 2022-04-14 RX ADMIN — TAZOBACTAM SODIUM AND PIPERACILLIN SODIUM SCH MLS/HR: 500; 4 INJECTION, SOLUTION INTRAVENOUS at 00:51

## 2022-04-14 RX ADMIN — INSULIN LISPRO SCH UNITS: 100 INJECTION, SOLUTION INTRAVENOUS; SUBCUTANEOUS at 08:57

## 2022-04-14 RX ADMIN — HEPARIN SODIUM SCH UNIT: 5000 INJECTION, SOLUTION INTRAVENOUS; SUBCUTANEOUS at 08:50

## 2022-04-14 NOTE — NUR
patient stable and appropriate for discharge home with family. IV removed, all belongings 
taken from room. Home medications returned to patient from pharmacy. New rx e-scripted to 
preferred pharmacy. All discharge instructions and education given and reviewed with 
patient, all questions answered.

## 2022-04-14 NOTE — NUR
Problems reprioritized. Patient report given, questions answered & plan of care reviewed 
with SILVIANO Ford. 

-------------------------------------------------------------------------------

Addendum: 04/14/22 at 0630 by Gerardo Nguyen RN

-------------------------------------------------------------------------------

Amended: Links added.